# Patient Record
Sex: FEMALE | Race: WHITE | Employment: OTHER | URBAN - METROPOLITAN AREA
[De-identification: names, ages, dates, MRNs, and addresses within clinical notes are randomized per-mention and may not be internally consistent; named-entity substitution may affect disease eponyms.]

---

## 2017-09-29 ENCOUNTER — HOSPITAL ENCOUNTER (OUTPATIENT)
Dept: MAMMOGRAPHY | Age: 72
Discharge: HOME | End: 2017-09-29
Attending: PHYSICIAN ASSISTANT
Payer: MEDICARE

## 2017-09-29 DIAGNOSIS — Z12.31 ENCOUNTER FOR SCREENING MAMMOGRAM FOR BREAST CANCER: ICD-10-CM

## 2017-09-29 PROCEDURE — 77067 SCR MAMMO BI INCL CAD: CPT

## 2017-10-02 NOTE — PROGRESS NOTES
Please call Alverto Wilson and let her know that her mammogram was normal. Repeat in 1 year. Follow up as scheduled.

## 2017-12-04 PROBLEM — I10 ESSENTIAL HYPERTENSION WITH GOAL BLOOD PRESSURE LESS THAN 130/85: Status: ACTIVE | Noted: 2017-12-04

## 2017-12-04 PROBLEM — R55 SYNCOPE AND COLLAPSE: Status: ACTIVE | Noted: 2017-12-04

## 2018-01-04 PROBLEM — F33.9 RECURRENT DEPRESSION (HCC): Status: ACTIVE | Noted: 2018-01-04

## 2018-01-04 PROBLEM — E78.2 MIXED HYPERLIPIDEMIA: Status: ACTIVE | Noted: 2018-01-04

## 2018-02-09 PROBLEM — I10 ESSENTIAL HYPERTENSION WITH GOAL BLOOD PRESSURE LESS THAN 130/85: Status: RESOLVED | Noted: 2017-12-04 | Resolved: 2018-02-09

## 2018-05-07 PROBLEM — R55 SYNCOPE AND COLLAPSE: Status: RESOLVED | Noted: 2017-12-04 | Resolved: 2018-05-07

## 2018-05-16 ENCOUNTER — HOSPITAL ENCOUNTER (OUTPATIENT)
Dept: MAMMOGRAPHY | Age: 73
Discharge: HOME | End: 2018-05-16
Attending: PHYSICIAN ASSISTANT
Payer: MEDICARE

## 2018-05-16 DIAGNOSIS — Z78.0 POSTMENOPAUSAL ESTROGEN DEFICIENCY: ICD-10-CM

## 2018-05-16 PROCEDURE — 77080 DXA BONE DENSITY AXIAL: CPT

## 2018-05-16 NOTE — PROGRESS NOTES
Please let Dalejazmin Estephania know that her bone density test was normal. Follow up as scheduled.

## 2018-10-02 ENCOUNTER — HOSPITAL ENCOUNTER (OUTPATIENT)
Dept: MAMMOGRAPHY | Age: 73
Discharge: HOME | End: 2018-10-02
Attending: FAMILY MEDICINE
Payer: MEDICARE

## 2018-10-02 DIAGNOSIS — Z12.31 ENCOUNTER FOR SCREENING MAMMOGRAM FOR BREAST CANCER: ICD-10-CM

## 2018-10-02 PROCEDURE — 77067 SCR MAMMO BI INCL CAD: CPT

## 2018-11-13 PROBLEM — R63.4 WEIGHT LOSS: Status: ACTIVE | Noted: 2018-11-13

## 2018-11-28 ENCOUNTER — HOSPITAL ENCOUNTER (OUTPATIENT)
Dept: ULTRASOUND IMAGING | Age: 73
Discharge: HOME | End: 2018-11-28
Attending: FAMILY MEDICINE
Payer: MEDICARE

## 2018-11-28 DIAGNOSIS — I82.412 DEEP VEIN THROMBOSIS (DVT) OF FEMORAL VEIN OF LEFT LOWER EXTREMITY, UNSPECIFIED CHRONICITY (HCC): ICD-10-CM

## 2018-11-28 PROCEDURE — 93970 EXTREMITY STUDY: CPT

## 2018-12-20 PROCEDURE — 88305 TISSUE EXAM BY PATHOLOGIST: CPT

## 2018-12-21 ENCOUNTER — HOSPITAL ENCOUNTER (OUTPATIENT)
Dept: LAB | Age: 73
Discharge: HOME | End: 2018-12-21

## 2019-01-29 ENCOUNTER — HOSPITAL ENCOUNTER (OUTPATIENT)
Dept: LAB | Age: 74
Discharge: HOME | End: 2019-01-29

## 2019-01-29 PROCEDURE — 88305 TISSUE EXAM BY PATHOLOGIST: CPT

## 2019-01-29 PROCEDURE — 88312 SPECIAL STAINS GROUP 1: CPT

## 2019-05-08 PROBLEM — R55 SYNCOPE AND COLLAPSE: Status: ACTIVE | Noted: 2019-05-08

## 2019-05-08 PROBLEM — R42 DIZZINESS: Status: ACTIVE | Noted: 2019-05-08

## 2019-05-08 PROBLEM — E11.9 TYPE 2 DIABETES MELLITUS WITHOUT COMPLICATION (HCC): Status: ACTIVE | Noted: 2019-05-08

## 2020-06-03 PROBLEM — E11.21 TYPE 2 DIABETES WITH NEPHROPATHY (HCC): Status: ACTIVE | Noted: 2020-06-03

## 2020-09-03 PROBLEM — E11.9 TYPE 2 DIABETES MELLITUS WITHOUT COMPLICATION (HCC): Status: RESOLVED | Noted: 2019-05-08 | Resolved: 2020-09-03

## 2021-04-27 ENCOUNTER — HOSPITAL ENCOUNTER (OUTPATIENT)
Dept: MAMMOGRAPHY | Age: 76
Discharge: HOME | End: 2021-04-27
Attending: FAMILY MEDICINE
Payer: MEDICARE

## 2021-04-27 DIAGNOSIS — Z12.31 ENCOUNTER FOR SCREENING MAMMOGRAM FOR BREAST CANCER: ICD-10-CM

## 2021-04-27 PROCEDURE — 77067 SCR MAMMO BI INCL CAD: CPT

## 2021-07-19 ENCOUNTER — TELEPHONE (OUTPATIENT)
Dept: PHARMACY | Age: 76
End: 2021-07-19

## 2021-07-19 NOTE — TELEPHONE ENCOUNTER
CLINICAL PHARMACY: ADHERENCE REVIEW  Identified care gap per Brooke; fills at [facility]: ACE/ARB, Diabetes and Statin adherence    Last Visit: 6/21/21    Patient identified as LIS = 2, therefore patient may be able to receive a 90-day supply for the same cost as a 30-day supply    Patient found in Outcomes MTM and is not currently eligible for CMR/TIP    ASSESSMENT  ACE/ARB ADHERENCE    Per Insurance Records through 7/7/21 (2020 [place] = n/a; YTD [facility] [place] = filled only once, potential fail date 7/25/21):   Lisinopril 10 mg tab last filled on 2/23/21 for 90 day supply. Next refill due: 5/24/21    Per Outcomes MTM Records:   Lisinopril last filled on 2/23/21 for 90 day supply. Per [facility]:   Lisinopril last picked up February. Refills remaining - $0.75 for refill, ready after 2pm. Staff stated son usually picks up meds for pt    BP Readings from Last 3 Encounters:   06/21/21 108/66   06/21/21 108/66   03/12/21 124/72     Estimated Creatinine Clearance: 52.1 mL/min (by C-G formula based on SCr of 0.73 mg/dL). DIABETES ADHERENCE    Per Insurance Records through 7/7/21 ([id number] Marie = did not meet metric; YTD PDC = 83%; Potential Fail Date: 10/23/21): Metformin  mg tab last filled on 6/15/21 for 90 day supply. Next refill due: 9/13/21    Per Outcomes MT Records:   Metformin ER last filled on 6/15/21, 2/23/21 for 90 day supply. Per [facility]:   Metformin last picked up on 6/16/21 for 90 day supply. Lab Results   Component Value Date/Time    Hemoglobin A1c 5.9 (H) 02/05/2018 02:34 PM    Hemoglobin A1c 5.6 06/29/2016 05:13 PM    Hemoglobin A1c (POC) 5.3 06/21/2021 11:20 AM    Hemoglobin A1c (POC) 5.3 03/12/2021 10:33 AM    Hemoglobin A1c (POC) 5.4 12/10/2020 02:20 PM    Hemoglobin A1c, External 5.6 04/06/2015 12:00 AM     NOTE A1c <9%    STATIN ADHERENCE    Per Insurance Records through 7/7/21 (2020 [facility] = n/a; YTD PDC = 83%; Potential Fail Date: 10/23/21):    Simvastatin 80 mg tab last filled on 6/15/21 for 90 day supply. Next refill due: 9/13/21    Per Outcomes [facility] Records:   Simvastatin last filled on 6/15/21, 2/23/21 for 90 day supply. Per [facility]:   Simvastatin last picked up on 6/16/21 for 90 day supply. Lab Results   Component Value Date/Time    Cholesterol, total 147 03/12/2021 10:58 AM    HDL Cholesterol 63 03/12/2021 10:58 AM    LDL, calculated 74 03/12/2021 10:58 AM    LDL, calculated 119 (H) 06/03/2020 02:35 PM    VLDL, calculated 10 03/12/2021 10:58 AM    VLDL, calculated 59 (H) 06/03/2020 02:35 PM    Triglyceride 47 03/12/2021 10:58 AM     ALT (SGPT)   Date Value Ref Range Status   03/12/2021 17 0 - 32 IU/L Final     AST (SGOT)   Date Value Ref Range Status   03/12/2021 28 0 - 40 IU/L Final     The 10-year ASCVD risk score (Rosie Barnhart, et al., 2013) is: 24.2%    Values used to calculate the score:      Age: 78 years      Sex: Female      Is Non- : No      Diabetic: Yes      Tobacco smoker: No      Systolic Blood Pressure: 821 mmHg      Is BP treated: Yes      HDL Cholesterol: 63 mg/dL      Total Cholesterol: 147 mg/dL     PLAN  The following are interventions that have been identified:  - Patient overdue refilling lisinopril and active on home medication list   - has supply of metformin and simvastatin but would like to discuss adherence due to low [place]    Reached pt at home number. Pt was pleasant but not very forthcoming over the phone. States she has her medications and takes without issues. Edu on last fill of lisinopril. Was not near her bottles to double check. Encouraged to take medications regularly without missing doses. Advised pharmacy getting lisinopril ready for her for  after 2pm today, pt was agreeable to .     Future Appointments   Date Time Provider Dulce Gonzalez   9/22/2021  9:10 AM Keily Gonzales MD [facility]       Chele Alonso, PharmD, [address] [place] [address] Clinical Pharmacy  Department, toll free: [phone], option [address] in place: No   Recommendation Provided To: Patient/Caregiver: 1 via Telephone and Pharmacy: 1   Intervention Detail: Adherence Monitorin   Gap Closed?: Yes   Intervention Accepted By: Patient/Caregiver: 1 and Pharmacy: 1   Time Spent (min): 15

## 2021-10-18 ENCOUNTER — TELEPHONE (OUTPATIENT)
Dept: PHARMACY | Age: 76
End: 2021-10-18

## 2021-10-18 NOTE — TELEPHONE ENCOUNTER
CLINICAL PHARMACY: ADHERENCE REVIEW  Identified care gap per Brooke; fills at [facility]: ACE/ARB, Diabetes and Statin adherence    Last Visit: 9/22/21    Patient identified as LIS = 2, therefore patient may be able to receive a 90-day supply for the same cost as a 30-day supply    ASSESSMENT  ACE/ARB ADHERENCE    Per Insurance Records through 10/11/21 (2020 [facility] = n/a; YTD PDC = 75%; Potential Fail Date: 10/23/21 (given refill due date, likely not updated)):   Lisinopril 10 mg tab last filled on 10/10/21 for 90 day supply. Next refill due: 1/8/22    Per Reconciled Dispense Report: 4/1/21 to 9/22/21  LISINOPRIL  10 MG TABS 07/19/2021 90 90 Tablet LAITH GIRON [facility] .. Per [facility]:   Lisinopril last picked up on 10/11/21 for 90 day supply. BP Readings from Last 3 Encounters:   09/22/21 104/62   06/21/21 108/66   06/21/21 108/66     Estimated Creatinine Clearance: 51.5 mL/min (by C-G formula based on SCr of 0.61 mg/dL). DIABETES ADHERENCE    Per Insurance Records through 10/11/21 (2020 [facility] = n/a; YTD PDC = 78%; Potential Fail Date: 10/22/21): Metformin  mg tab last filled on 6/15/21 for 90 day supply (90 tabs). Next refill due: 9/13/21    Per Reconciled Dispense Report: no claims? Per [facility]:   Metformin last picked up on 6/16/21 for 90 day supply; currently ready for  10/12/21 (on 10/22/21). copay no charge. Lab Results   Component Value Date/Time    Hemoglobin A1c 5.9 (H) 02/05/2018 02:34 PM    Hemoglobin A1c 5.6 06/29/2016 05:13 PM    Hemoglobin A1c (POC) 5.1 09/22/2021 09:23 AM    Hemoglobin A1c (POC) 5.3 06/21/2021 11:20 AM    Hemoglobin A1c (POC) 5.3 03/12/2021 10:33 AM    Hemoglobin A1c, External 5.6 04/06/2015 12:00 AM     NOTE A1c <9%    STATIN ADHERENCE    Per Insurance Records through 10/11/21 (2020 [facility] = n/a; YTD PDC = 85%; Potential Fail Date: has met metric for 2021):   Simvastatin 80 mg tab last filled on 9/23/21 for 90 day supply. Next refill due: 12/22/21    Lab Results   Component Value Date/Time    Cholesterol, total 137 09/22/2021 09:22 AM    HDL Cholesterol 55 09/22/2021 09:22 AM    LDL, calculated 61 09/22/2021 09:22 AM    LDL, calculated 119 (H) 06/03/2020 02:35 PM    VLDL, calculated 21 09/22/2021 09:22 AM    VLDL, calculated 59 (H) 06/03/2020 02:35 PM    Triglyceride 117 09/22/2021 09:22 AM     ALT (SGPT)   Date Value Ref Range Status   09/22/2021 7 0 - 32 IU/L Final     AST (SGOT)   Date Value Ref Range Status   09/22/2021 9 0 - 40 IU/L Final     The 10-year ASCVD risk score (Therese Felipe, et al., 2013) is: 22.5%    Values used to calculate the score:      Age: 78 years      Sex: Female      Is Non- : No      Diabetic: Yes      Tobacco smoker: No      Systolic Blood Pressure: 953 mmHg      Is BP treated: Yes      HDL Cholesterol: 55 mg/dL      Total Cholesterol: 137 mg/dL     PLAN  The following are interventions that have been identified:  - Patient overdue refilling metformin and active on home medication list - currently ready for  at pharmacy $0 copay    Attempted to reach patient. No answer, unable to LM.     Future Appointments   Date Time Provider Dulce Gonzalez   12/13/2021  9:15 AM Dory Kahn NP [facility]   12/22/2021 10:40 AM Olman Clifford MD [facility]       Kyaw Olivarez, PharmD, [facility]  Department, toll free: [phone]

## 2021-10-18 NOTE — LETTER
[address]  [address], [address] [facility]  [address] [facility] [id number]           10/26/21     Dear Corinna Craig,    We tried to reach you recently regarding your metformin, but were unable to reach you on the telephone. We have on file that you are currently taking metformin  mg tablet - 1 tablet everyday. If you are no longer taking or taking differently, please call us at the number below so that we can discuss this and update your medication profile. It appears that this medication has not been filled at proper times. We are worried you might be missing doses or not taking it as directed. It is important that you take your medications regularly and try not to miss a single dose.     Some ways to help you remember to take and refill your medications are to:  · Use a pill box, set an alarm, and/or keep your medication near something that you do every day  · Fill a 3-month supply of your prescription at a time to save you time and trips to the pharmacy  · Ask your pharmacy if they participate in [facility]", a program where you can  all of your medications on the same day  · Ask your pharmacy if you can be set up with automatic refill, where they will automatically refill your prescription when it is due and let you know it's ready to     Sincerely,   Lane Verdugo, PharmD, [facility]  Department, toll free: [phone]

## 2021-10-22 NOTE — TELEPHONE ENCOUNTER
Called pharmacy and confirmed Metformin not picked up yet but still filled/awaiting . Called patient and got VM right away which was full. Will check back on this on 10/25/21.        [address]  Clinical Pharmacy   Phone: [phone]

## 2021-10-25 NOTE — TELEPHONE ENCOUNTER
Called pharmacy again and confirmed Metformin was not picked up. Pharmacy had put back over weekend since it had been so long. Had pharmacy fill again through [facility]. Attempted to contact patient and goes straight to message saying VM is full.     [address]  Clinical Pharmacy   Phone: [phone]

## 2021-10-26 NOTE — TELEPHONE ENCOUNTER
Noted, thank you. Will send letter, pt likely will fail metric.     For Pharmacy Admin Tracking Only     Gap Closed?: No   Time Spent (min): 15

## 2022-03-18 PROBLEM — R55 SYNCOPE AND COLLAPSE: Status: ACTIVE | Noted: 2019-05-08

## 2022-03-19 PROBLEM — E78.2 MIXED HYPERLIPIDEMIA: Status: ACTIVE | Noted: 2018-01-04

## 2022-03-19 PROBLEM — E11.21 TYPE 2 DIABETES WITH NEPHROPATHY (HCC): Status: ACTIVE | Noted: 2020-06-03

## 2022-03-19 PROBLEM — R63.4 WEIGHT LOSS: Status: ACTIVE | Noted: 2018-11-13

## 2022-03-19 PROBLEM — R42 DIZZINESS: Status: ACTIVE | Noted: 2019-05-08

## 2022-03-20 PROBLEM — F33.9 RECURRENT DEPRESSION (HCC): Status: ACTIVE | Noted: 2018-01-04

## 2022-03-21 ENCOUNTER — TELEPHONE (OUTPATIENT)
Dept: PHARMACY | Age: 77
End: 2022-03-21

## 2022-03-21 NOTE — TELEPHONE ENCOUNTER
[facility] CLINICAL PHARMACY: ADHERENCE REVIEW  Identified care gap per [employer]: fills at [facility]: Diabetes adherence; SUPD    Last Visit: 12/22/21    Patient identified as LIS = 2, therefore patient may be able to receive a 90-day supply for the same cost as a 30-day supply    Patient also appears to be prescribed: lisinopril, simvastatin    ASSESSMENT  ACE/ARB ADHERENCE    Insurance Records claims through 3/21/22: no claims yet in 2022  Lisinopril 10 mg tab - 1 daily per current med list    Per Reconciled Dispense Report: 9/2/21 to 12/22/21  LISINOPRIL  10 MG TABS 10/10/2021 90 90 Tablet LAITH GIRON [facility] .. Last Rx sent 1/3/22 x 90 ds, 3 refills to [address] [place] [address] - believe should have Rx on file    BP Readings from Last 3 Encounters:   12/22/21 104/62   09/22/21 104/62   06/21/21 108/66     Estimated Creatinine Clearance: 50.5 mL/min (by C-G formula based on SCr of 0.62 mg/dL). DIABETES ADHERENCE    Insurance Records claims through 3/21/22 (2021 [facility] Marie = n/a; YTD [facility] = 88%; Potential Fail Date: 5/16/22): Metformin  mg tab last filled on 3/7/22 for 11 day supply. Next refill due: 3/18/22    Per Reconciled Dispense Report: 9/2/21 to 12/22/21  METFORMIN HYDROCHLORIDE ER  500 MG TB24 10/25/2021 90 90 Tablet LAITH GIRON [facility] .. Per [facility]:   Metformin last picked up on 3/7/22 for 11 day supply. Rx currently ready for pick - $0 for 90 day supply.     Lab Results   Component Value Date/Time    Hemoglobin A1c 5.9 (H) 02/05/2018 02:34 PM    Hemoglobin A1c 5.6 06/29/2016 05:13 PM    Hemoglobin A1c (POC) 5.4 12/22/2021 11:24 AM    Hemoglobin A1c (POC) 5.1 09/22/2021 09:23 AM    Hemoglobin A1c (POC) 5.3 06/21/2021 11:20 AM    Hemoglobin A1c, External 5.6 04/06/2015 12:00 AM     NOTE A1c not yet completed this calendar year    [id number] Records claims through 3/21/22: no claims yet in 2022  Simvastatin 80 mg tab - 1 tab nightly per med list    Per Reconciled Dispense Report: 9/2/21 to 12/22/21  SIMVASTATIN  80 MG TABS 09/23/2021 90 90 Tablet LAITH GIRON [facility] .. Lab Results   Component Value Date/Time    Cholesterol, total 147 12/22/2021 11:41 AM    HDL Cholesterol 71 12/22/2021 11:41 AM    LDL-C, External 57 04/06/2015 12:00 AM    LDL, calculated 62 12/22/2021 11:41 AM    LDL, calculated 119 (H) 06/03/2020 02:35 PM    VLDL, calculated 14 12/22/2021 11:41 AM    VLDL, calculated 59 (H) 06/03/2020 02:35 PM    Triglyceride 68 12/22/2021 11:41 AM     ALT (SGPT)   Date Value Ref Range Status   12/22/2021 6 0 - 32 IU/L Final     AST (SGOT)   Date Value Ref Range Status   12/22/2021 9 0 - 40 IU/L Final     The 10-year ASCVD risk score (Marysol Rodriguez, et al., 2013) is: 22.6%    Values used to calculate the score:      Age: 78 years      Sex: Female      Is Non- : No      Diabetic: Yes      Tobacco smoker: No      Systolic Blood Pressure: 883 mmHg      Is BP treated: Yes      HDL Cholesterol: 71 mg/dL      Total Cholesterol: 147 mg/dL     PLAN  The following are interventions that have been identified:  - metformin ready for  for 90 day supply - $0 copay. Able to see claim in Practice Assist at time of writing (pt initiated refill request 3/21/22)  - SUPD care gap - prescribed simvastatin    No patient out reach planned at this time.     Future Appointments   Date Time Provider Dulce Gonzalez   3/25/2022  9:40 AM Antonio Giron MD [facility] D[facility] Lucile Flow, PharmD, [facility]  Department, toll free: [phone]     For Pharmacy Admin Tracking Only     Time Spent (min): 5

## 2022-05-31 RX ORDER — BUSPIRONE HYDROCHLORIDE 5 MG/1
TABLET ORAL
Qty: 180 TABLET | Refills: 2 | Status: SHIPPED | OUTPATIENT
Start: 2022-05-31

## 2022-12-19 RX ORDER — PAROXETINE 30 MG/1
TABLET, FILM COATED ORAL
Qty: 30 TABLET | Refills: 1 | Status: SHIPPED | OUTPATIENT
Start: 2022-12-19

## 2023-01-06 ENCOUNTER — TELEPHONE (OUTPATIENT)
Dept: FAMILY MEDICINE CLINIC | Facility: CLINIC | Age: 78
End: 2023-01-06

## 2023-01-06 RX ORDER — POTASSIUM CHLORIDE 750 MG/1
10 TABLET, EXTENDED RELEASE ORAL DAILY
Qty: 90 TABLET | Refills: 3 | Status: SHIPPED | OUTPATIENT
Start: 2023-01-06

## 2023-01-06 NOTE — TELEPHONE ENCOUNTER
----- Message from Crystal Saini sent at 1/6/2023 11:39 AM EST -----  Subject: Refill Request    QUESTIONS  Name of Medication? potassium chloride (KLOR-CON M) 10 MEQ extended   release tablet  Patient-reported dosage and instructions? 1 daily   How many days do you have left? 0  [facility]? [id number]  Pharmacy phone number (if available)? [phone]  ---------------------------------------------------------------------------  --------------  Annetta LANGLEY  What is the best way for the office to contact you? OK to leave message on   voicemail  Preferred Call Back Phone Number? [phone]  ---------------------------------------------------------------------------  --------------  SCRIPT ANSWERS  Relationship to Patient?  Self

## 2023-08-08 RX ORDER — PANTOPRAZOLE SODIUM 40 MG/1
TABLET, DELAYED RELEASE ORAL
Qty: 90 TABLET | OUTPATIENT
Start: 2023-08-08

## 2024-02-05 RX ORDER — SIMVASTATIN 80 MG
80 TABLET ORAL NIGHTLY
Qty: 90 TABLET | OUTPATIENT
Start: 2024-02-05